# Patient Record
Sex: FEMALE | Race: WHITE | NOT HISPANIC OR LATINO
[De-identification: names, ages, dates, MRNs, and addresses within clinical notes are randomized per-mention and may not be internally consistent; named-entity substitution may affect disease eponyms.]

---

## 2019-08-15 ENCOUNTER — APPOINTMENT (OUTPATIENT)
Dept: ORTHOPEDIC SURGERY | Facility: CLINIC | Age: 24
End: 2019-08-15
Payer: COMMERCIAL

## 2019-08-15 VITALS
HEART RATE: 64 BPM | BODY MASS INDEX: 26.12 KG/M2 | WEIGHT: 153 LBS | DIASTOLIC BLOOD PRESSURE: 89 MMHG | SYSTOLIC BLOOD PRESSURE: 134 MMHG | HEIGHT: 64 IN

## 2019-08-15 DIAGNOSIS — M54.2 CERVICALGIA: ICD-10-CM

## 2019-08-15 DIAGNOSIS — M54.5 LOW BACK PAIN: ICD-10-CM

## 2019-08-15 DIAGNOSIS — G89.29 LOW BACK PAIN: ICD-10-CM

## 2019-08-15 PROCEDURE — 99203 OFFICE O/P NEW LOW 30 MIN: CPT

## 2019-10-01 ENCOUNTER — APPOINTMENT (OUTPATIENT)
Dept: ORTHOPEDIC SURGERY | Facility: CLINIC | Age: 24
End: 2019-10-01
Payer: COMMERCIAL

## 2019-10-01 VITALS
BODY MASS INDEX: 25.49 KG/M2 | HEART RATE: 84 BPM | HEIGHT: 65 IN | SYSTOLIC BLOOD PRESSURE: 149 MMHG | WEIGHT: 153 LBS | DIASTOLIC BLOOD PRESSURE: 95 MMHG

## 2019-10-01 DIAGNOSIS — M23.92 UNSPECIFIED INTERNAL DERANGEMENT OF LEFT KNEE: ICD-10-CM

## 2019-10-01 PROCEDURE — 99213 OFFICE O/P EST LOW 20 MIN: CPT

## 2019-10-01 PROCEDURE — 73562 X-RAY EXAM OF KNEE 3: CPT | Mod: LT

## 2019-10-01 NOTE — DISCUSSION/SUMMARY
[de-identified] : Discussion had with patient - clinically patient with snapping popliteal tendon, will try conservative treatment and have follow up with Sports\par Patient will follow up with [facility] \par Patient aware must follow up with MD for further eval and treatment \par Patient will start Physical Therapy \par Patients questions were answered and patient is satisfied with today's visit\par

## 2019-10-01 NOTE — PHYSICAL EXAM
[UE/LE] : Sensory: Intact in bilateral upper & lower extremities [ALL] : Biceps, brachioradialis, triceps, patellar, ankle and plantar 2+ and symmetric bilaterally [Poor Appearance] : well-appearing [Acute Distress] : not in acute distress [Normal] : Oriented to person, place, and time, insight and judgement were intact and the affect was normal [de-identified] : 3 view left knee reveals no acute findings  [de-identified] : \par FROM to hip\par \par Skin Warm, Dry, no erythema, no lesions \par \par Knee \par stable\par anatomic alignment\par ROM 0-130\par Valgus/Varus Stress intact \par Effusion - Negative\par Crepitus - Negative \par Patella Grind - Negative \par Patella Apprehension - Negative \par Medial joint line tenderness - Negative\par Lateral Joint line tenderness - Negative\par Tiffany Test - Negative  \par Lachman test - Negative \par Anterior Drawer Test -  Negative \par \par Distal Motor Function intact \par Sensation intact to light touch bilaterally \par Pulses 2+ bilaterally\par

## 2019-10-01 NOTE — HISTORY OF PRESENT ILLNESS
[de-identified] : Patient is a 24 year old female who presents c/o chronic sensation of snapping to posterior left knee. 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 patient states when she flexes knee and than extends she feels a popping sensation.  no buckling, locking of knee. patient has not had any treatment for knee [Pain Location] : pain

## 2019-10-04 PROBLEM — M23.92 INTERNAL DERANGEMENT OF LEFT KNEE: Status: ACTIVE | Noted: 2019-10-01

## 2019-10-25 ENCOUNTER — MOBILE ON CALL (OUTPATIENT)
Age: 24
End: 2019-10-25

## 2021-07-10 ENCOUNTER — APPOINTMENT (OUTPATIENT)
Dept: ORTHOPEDIC SURGERY | Facility: CLINIC | Age: 26
End: 2021-07-10
Payer: COMMERCIAL

## 2021-07-10 VITALS
WEIGHT: 160 LBS | HEIGHT: 65 IN | DIASTOLIC BLOOD PRESSURE: 90 MMHG | SYSTOLIC BLOOD PRESSURE: 150 MMHG | BODY MASS INDEX: 26.66 KG/M2 | HEART RATE: 61 BPM

## 2021-07-10 DIAGNOSIS — M79.A29 NONTRAUMATIC COMPARTMENT SYNDROME OF UNSPECIFIED LOWER EXTREMITY: ICD-10-CM

## 2021-07-10 PROCEDURE — 99072 ADDL SUPL MATRL&STAF TM PHE: CPT

## 2021-07-10 PROCEDURE — 99214 OFFICE O/P EST MOD 30 MIN: CPT

## 2021-07-10 NOTE — PHYSICAL EXAM
[de-identified] : Physical exam of bilateral lower extremities (shins):\par \par No erythema, warmth, rubor present.  The patient does have some mild tenderness bilateral tibias, mid tibial shaft.  She also has some mild tenderness over the anterior tibialis muscle bellies.  The patient has negative Homans signs bilaterally.  She has normal knee range of motion with normal knee exams.  She has normal ankle range of motion with normal ankle exams.  Neurovascularly intact.  Capillary refill in the toes is normal.  Dorsalis pedis pulse on both feet is normal. [de-identified] : No imaging performed today.

## 2021-07-10 NOTE — DISCUSSION/SUMMARY
[de-identified] : At this time I explained to the patient that I do believe she has some mild shinsplints but I am more concerned for exertional compartment syndrome in bilateral lower extremities.  I gave her a referral to meet with our sports medicine physician as soon as possible to be evaluated for exertional compartment syndrome in both of her legs.  In the interim the patient will refrain from anything that causes increased pain in her lower legs, exercise wise.  All of her questions were answered and she understood the treatment course at this time.

## 2021-07-10 NOTE — HISTORY OF PRESENT ILLNESS
[de-identified] : The patient is a 25-year-old female who presents with chronic bilateral shin pain.  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  She feels as though her legs want to explode and her feet want to explode while she is running.  They feel extra heavy when this occurs with severe pain.  Her pain scale at rest is 0 out of 10.  Her pain scale at worst with exercising is an 8 out of 10.  It is preventing her from running long distance.  She is here to have her shins evaluated.  No numbness or tingling going down her legs.  No other complaints.

## 2021-07-10 NOTE — REASON FOR VISIT
[Initial Visit] : an initial visit for [Other: ____] : [unfilled] [FreeTextEntry2] : Chronic bilateral shin pain

## 2021-08-02 ENCOUNTER — APPOINTMENT (OUTPATIENT)
Dept: ORTHOPEDIC SURGERY | Facility: CLINIC | Age: 26
End: 2021-08-02
Payer: COMMERCIAL

## 2021-08-02 VITALS
BODY MASS INDEX: 26.66 KG/M2 | DIASTOLIC BLOOD PRESSURE: 94 MMHG | HEART RATE: 70 BPM | HEIGHT: 65 IN | WEIGHT: 160 LBS | SYSTOLIC BLOOD PRESSURE: 141 MMHG

## 2021-08-02 DIAGNOSIS — Z78.9 OTHER SPECIFIED HEALTH STATUS: ICD-10-CM

## 2021-08-02 PROCEDURE — 73590 X-RAY EXAM OF LOWER LEG: CPT | Mod: 50

## 2021-08-02 PROCEDURE — 99213 OFFICE O/P EST LOW 20 MIN: CPT

## 2021-08-30 ENCOUNTER — APPOINTMENT (OUTPATIENT)
Dept: ORTHOPEDIC SURGERY | Facility: CLINIC | Age: 26
End: 2021-08-30
Payer: COMMERCIAL

## 2021-08-30 VITALS
WEIGHT: 160 LBS | HEIGHT: 65 IN | HEART RATE: 67 BPM | BODY MASS INDEX: 26.66 KG/M2 | SYSTOLIC BLOOD PRESSURE: 132 MMHG | DIASTOLIC BLOOD PRESSURE: 86 MMHG

## 2021-08-30 DIAGNOSIS — S86.899A OTHER INJURY OF OTHER MUSCLE(S) AND TENDON(S) AT LOWER LEG LEVEL, UNSPECIFIED LEG, INITIAL ENCOUNTER: ICD-10-CM

## 2021-08-30 PROCEDURE — 99213 OFFICE O/P EST LOW 20 MIN: CPT

## 2021-08-30 NOTE — PHYSICAL EXAM
[de-identified] : General:\par Awake, alert, no acute distress, Patient was cooperative and appropriate during the examination.\par \par The patient's weight is of normal weight for height and age.\par \par Ambulates without an antalgic gait.\par \par Full, painless range of motion of the neck and back.\par \par Exam of the bilateral lower extremities is intact and symmetric with regards to dermatologic, vascular, and neurologic exam. Bilateral lower extremity sensation is grossly intact to light touch in the DP/SP/T/S/S nerve distributions. Intact DF/PF/EHL. BIlateral lower extremity warm and well-perfused with brisk capillary refill.\par \par Pulmonary:\par Regular, nonlabored breathing\par \par Abdomen:\par Soft, nontender, nondistended.\par \par Lymphatic:\par No evidence of inguinal lymphadenopathy\par \par \par \par Bilateral lower extremity examination:\par Physical examination of the bilateral lower extremities demonstrates normal skin without signs of skin changes or abnormalities. No erythema, warmth, or joint effusion is appreciated.\par \par Sensation is intact to light touch L2-S1 bilaterally\par Palpable DP/PT pulses\par EHL/FHL/TA/GSC motor function intact bilaterally\par \par Knee range of motion:\par 0 to 130 degrees \par \par Ankle Range of Motion:\par Dorsiflexion 20\par Plantarflexion 40\par Inversion 30\par Eversion 20\par \par Strength Testing\par Quadriceps/hamstrings 5/5\par Dorsiflexion 5/5\par Plantarflexion 5/5\par Inversion 5/5\par Eversion 5/5\par \par Palpation\par Not tender to palpation about the knees\par Not tender to palpation over the lateral malleoli\par Not tender to palpation over the medial malleoli\par Mild tenderness to palpation over the anteromedial borders of the tibias, worse on the right\par Not tender to palpation over the ATFLs, CFLs, PTFLs\par Not tender to palpation over the calcaneal tuberosities\par Not tender to palpation over the peroneal tendons\par Not tender to palpation over the tarsals, metatarsals, or phalanges\par No palpable defect within the achilles tendons\par \par Special Tests:\par Anterior/posterior drawer test of the knee are negative\par Lachman exam is negative\par No varus or valgus laxity of the knee at 0 or 30 degrees of knee flexion\par Ankle anterior drawer negative bilaterally\par Squeeze test negative bilaterally\par Krishnamurthy's test negative bilaterally [de-identified] : MRI of the right lower leg from  radiology was reviewed the patient today in the office.  There is no evidence of any stress fracture.  No muscular injury.  Nonspecific edema in the anterior soft tissues of the lower leg.\par \par X-rays including AP and lateral view of both tibias from my office on 8/2/2021 reviewed today again with the patient.  No acute fracture or dislocation. Patient does have cortical thickening of the medial distal tibial cortex which is more obvious in the right than the left without any obvious stress fracture.

## 2021-08-30 NOTE — DISCUSSION/SUMMARY
[de-identified] : Assessment: 26-year-old female with bilateral leg pain secondary to shinsplints\par \par Plan: I had a long discussion with the patient today regarding the nature of their diagnosis and treatment plan. We discussed the risks and benefits of no treatment as well as nonoperative and operative treatments.  I reviewed the patient's MRI today with her in the office which does not demonstrate any stress fracture of her right lower extremity.  Clinically, the patient has improved with symptomatic treatments which have included activity and routine modifications to her exercise program.  She is happy with her progress so far.  So long as she is pain-free she may continue to exercise regularly and may attempt to do more vigorous exercise as tolerated.  She is advised to start slowly and progress gradually.  At this point the patient may follow-up with me on an as-needed basis.  If she does continue to complain of any severe symptoms in the future we may refer her for consideration for testing of exertional compartment syndrome.\par \par The patient verbalizes their understanding and agrees with the plan.  All questions were answered to their satisfaction.

## 2021-08-30 NOTE — HISTORY OF PRESENT ILLNESS
[de-identified] : 8/30/21: Mellisa is a pleasant 26-year-old female returns to the office today for follow-up regarding her bilateral shin pain.  The patient states that her symptoms are improved since her previous visit.  She has been modifying her exercise routine and has avoided running.  She has been doing cycling instead and has been feeling better.  She recently had an MRI of the right lower extremity to exclude a medial tibial stress fracture and she comes in to discuss those results to me today and any further recommendations.  The patient denies any fevers, chills, sweats, recent illnesses, numbness, tingling, weakness, or pain elsewhere at this time.\par \par 8/2/21: Mellisa is a pleasant 26 year old female who presents with bilateral shin pain.  The patient states that her symptoms began approximately 1-1 and half years ago but she denies any history of trauma.  She is very active and participates in classes at [employer] which usually involve high intensity interval training.  The pain specifically begins after running for short period of time.  She gets pain in both legs but it is usually worse on the right.  When she stops running the pain eventually resolves over approximately 20 minutes or so.  If she rides a stationary bike or does a less repetitive impact on her legs she does not get the same type of symptoms.  She does not have any pain at rest.  She has seen FARRAH Gracia who evaluated her several weeks ago and was concern for a possible exertional compartment syndrome and she was referred to me for further evaluation.  She has not had any therapy or injections before.  She has not had any imaging before.  The patient denies any fevers, chills, sweats, recent illnesses, numbness, tingling, weakness, or pain elsewhere at this time.  She occasionally will get a numb sensation as well as a heaviness in her legs when she has the symptoms.

## 2021-10-22 ENCOUNTER — NON-APPOINTMENT (OUTPATIENT)
Age: 26
End: 2021-10-22

## 2021-10-22 ENCOUNTER — APPOINTMENT (OUTPATIENT)
Dept: FAMILY MEDICINE | Facility: CLINIC | Age: 26
End: 2021-10-22
Payer: COMMERCIAL

## 2021-10-22 VITALS
DIASTOLIC BLOOD PRESSURE: 78 MMHG | WEIGHT: 170 LBS | OXYGEN SATURATION: 98 % | SYSTOLIC BLOOD PRESSURE: 122 MMHG | BODY MASS INDEX: 28.32 KG/M2 | HEIGHT: 65 IN | HEART RATE: 110 BPM

## 2021-10-22 VITALS — SYSTOLIC BLOOD PRESSURE: 160 MMHG | DIASTOLIC BLOOD PRESSURE: 84 MMHG

## 2021-10-22 DIAGNOSIS — R03.0 ELEVATED BLOOD-PRESSURE READING, W/OUT DIAGNOSIS OF HYPERTENSION: ICD-10-CM

## 2021-10-22 DIAGNOSIS — Z23 ENCOUNTER FOR IMMUNIZATION: ICD-10-CM

## 2021-10-22 DIAGNOSIS — Z00.00 ENCOUNTER FOR GENERAL ADULT MEDICAL EXAMINATION W/OUT ABNORMAL FINDINGS: ICD-10-CM

## 2021-10-22 PROCEDURE — 99395 PREV VISIT EST AGE 18-39: CPT | Mod: 25

## 2021-10-22 PROCEDURE — G0008: CPT

## 2021-10-22 PROCEDURE — 90686 IIV4 VACC NO PRSV 0.5 ML IM: CPT

## 2021-10-22 PROCEDURE — 36415 COLL VENOUS BLD VENIPUNCTURE: CPT

## 2021-10-22 NOTE — ASSESSMENT
[FreeTextEntry1] : WILLIE LANDA is a 26 year  old female presenting to the clinic to establish care.\par \par # PE/Vitals \par - repeat blood pressure: 160 \par \par # PHQ-2 Behavioral Health Screenin\par # Reviewed Patient Medical History\par \par # Reviewed Patient Concerns\par - elevated blood pressure; has a family history of HTN and thyroid issues \par - back pain; possibly could be from enlarged breast \par \par

## 2021-10-22 NOTE — HEALTH RISK ASSESSMENT
[Good] : ~his/her~  mood as  good [No] : No [No falls in past year] : Patient reported no falls in the past year [PHQ-2 Negative - No further assessment needed] : PHQ-2 Negative - No further assessment needed [PHQ-9 Negative - No further assessment needed] : PHQ-9 Negative - No further assessment needed [Employed] : employed [] : No [[id number]] : 0

## 2021-10-22 NOTE — PLAN
[FreeTextEntry1] : # blood work today \par # administer Flu Vaccine today \par # advise to have a low sodium diet \par # Start Rx Amlodipine 5 MG for HTN, f/u if side effects occur \par # f/u in month or sooner if needed\par # need to see pain management

## 2021-10-22 NOTE — END OF VISIT
[FreeTextEntry3] : This note was written by Marilee Trivedi on 10/22/2021, acting as a scribe for Dr. Jh MD.\par \par All medical record entries were at my, Dr. Nan Peñaloza MD, direction and personally dictated by me in 10/22/2021. I have personally reviewed the chart and agree that the record accurately reflects my personal performance of the history, physical exam, assessment, and plan.\par

## 2021-10-22 NOTE — HISTORY OF PRESENT ILLNESS
[FreeTextEntry1] : NEW-CPE [de-identified] : WILLIE LANDA is a 26 year old female presenting to the clinic to establish care.  Mood is normal. Patient complains of back pain. States sometimes the pain is so severe that it radiates to her legs. Possibly could be from enlarged breasts. \par \par Patient states every time she goes to the doctor office it seems blood pressure is elevated. Has a concern if weight plays a factor in elevated blood pressure. Pt stopped taking birth control as advise by GYN. Patient has a family history of HTN and thyroid issues. . Notes she has a hard time losing weight. 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   \par \par Currently taking no medications. No Past Medical History. No Surgical History. Family Medical History includes: on maternal side HTN and thyroid issues No Known Allergies to food/medications. Denies any recent ER visits/ hospitalizations/ MVAs or MSK injuries. \par

## 2021-10-26 ENCOUNTER — TRANSCRIPTION ENCOUNTER (OUTPATIENT)
Age: 26
End: 2021-10-26

## 2021-11-06 LAB
ALBUMIN SERPL ELPH-MCNC: 5.2 G/DL
ALP BLD-CCNC: 63 U/L
ALT SERPL-CCNC: 15 U/L
ANION GAP SERPL CALC-SCNC: 14 MMOL/L
APPEARANCE: CLEAR
AST SERPL-CCNC: 16 U/L
BASOPHILS # BLD AUTO: 0.05 K/UL
BASOPHILS NFR BLD AUTO: 0.7 %
BILIRUB SERPL-MCNC: 0.4 MG/DL
BILIRUBIN URINE: NEGATIVE
BLOOD URINE: NEGATIVE
BUN SERPL-MCNC: 13 MG/DL
CALCIUM SERPL-MCNC: 9.9 MG/DL
CHLORIDE SERPL-SCNC: 102 MMOL/L
CO2 SERPL-SCNC: 25 MMOL/L
COLOR: YELLOW
CREAT SERPL-MCNC: 0.92 MG/DL
EOSINOPHIL # BLD AUTO: 0.07 K/UL
EOSINOPHIL NFR BLD AUTO: 0.9 %
ESTIMATED AVERAGE GLUCOSE: 100 MG/DL
FERRITIN SERPL-MCNC: 72 NG/ML
FOLATE SERPL-MCNC: 19.7 NG/ML
GLUCOSE QUALITATIVE U: NEGATIVE
GLUCOSE SERPL-MCNC: 107 MG/DL
HBA1C MFR BLD HPLC: 5.1 %
HCT VFR BLD CALC: 46.8 %
HGB BLD-MCNC: 15.6 G/DL
IMM GRANULOCYTES NFR BLD AUTO: 0.3 %
IRON SATN MFR SERPL: 18 %
IRON SERPL-MCNC: 57 UG/DL
KETONES URINE: NORMAL
LEUKOCYTE ESTERASE URINE: NEGATIVE
LYMPHOCYTES # BLD AUTO: 1.93 K/UL
LYMPHOCYTES NFR BLD AUTO: 25.6 %
MAN DIFF?: NORMAL
MCHC RBC-ENTMCNC: 30.9 PG
MCHC RBC-ENTMCNC: 33.3 GM/DL
MCV RBC AUTO: 92.7 FL
MONOCYTES # BLD AUTO: 0.44 K/UL
MONOCYTES NFR BLD AUTO: 5.8 %
NEUTROPHILS # BLD AUTO: 5.02 K/UL
NEUTROPHILS NFR BLD AUTO: 66.7 %
NITRITE URINE: NEGATIVE
PH URINE: 5.5
PLATELET # BLD AUTO: 357 K/UL
POTASSIUM SERPL-SCNC: 4.3 MMOL/L
PROT SERPL-MCNC: 7.8 G/DL
PROTEIN URINE: NORMAL
RBC # BLD: 5.05 M/UL
RBC # FLD: 11.8 %
SODIUM SERPL-SCNC: 141 MMOL/L
SPECIFIC GRAVITY URINE: 1.03
TIBC SERPL-MCNC: 327 UG/DL
TSH SERPL-ACNC: 2.92 UIU/ML
UIBC SERPL-MCNC: 270 UG/DL
UROBILINOGEN URINE: NORMAL
VIT B12 SERPL-MCNC: 519 PG/ML
WBC # FLD AUTO: 7.53 K/UL

## 2021-11-09 ENCOUNTER — TRANSCRIPTION ENCOUNTER (OUTPATIENT)
Age: 26
End: 2021-11-09

## 2021-11-09 LAB — 25(OH)D3 SERPL-MCNC: 27.1 NG/ML

## 2021-11-27 ENCOUNTER — APPOINTMENT (OUTPATIENT)
Dept: ULTRASOUND IMAGING | Facility: CLINIC | Age: 26
End: 2021-11-27
Payer: COMMERCIAL

## 2021-11-27 ENCOUNTER — OUTPATIENT (OUTPATIENT)
Dept: OUTPATIENT SERVICES | Facility: HOSPITAL | Age: 26
LOS: 1 days | End: 2021-11-27
Payer: COMMERCIAL

## 2021-11-27 DIAGNOSIS — R03.0 ELEVATED BLOOD-PRESSURE READING, WITHOUT DIAGNOSIS OF HYPERTENSION: ICD-10-CM

## 2021-11-30 ENCOUNTER — OUTPATIENT (OUTPATIENT)
Dept: OUTPATIENT SERVICES | Facility: HOSPITAL | Age: 26
LOS: 1 days | End: 2021-11-30

## 2021-11-30 ENCOUNTER — APPOINTMENT (OUTPATIENT)
Dept: ULTRASOUND IMAGING | Facility: CLINIC | Age: 26
End: 2021-11-30

## 2021-11-30 DIAGNOSIS — R03.0 ELEVATED BLOOD-PRESSURE READING, WITHOUT DIAGNOSIS OF HYPERTENSION: ICD-10-CM

## 2021-11-30 PROCEDURE — 93975 VASCULAR STUDY: CPT | Mod: 26

## 2021-11-30 PROCEDURE — 93975 VASCULAR STUDY: CPT

## 2021-12-03 ENCOUNTER — APPOINTMENT (OUTPATIENT)
Dept: FAMILY MEDICINE | Facility: CLINIC | Age: 26
End: 2021-12-03
Payer: COMMERCIAL

## 2021-12-03 VITALS
WEIGHT: 165 LBS | BODY MASS INDEX: 27.49 KG/M2 | SYSTOLIC BLOOD PRESSURE: 136 MMHG | OXYGEN SATURATION: 98 % | HEIGHT: 65 IN | DIASTOLIC BLOOD PRESSURE: 88 MMHG | HEART RATE: 106 BPM

## 2021-12-03 PROCEDURE — 99213 OFFICE O/P EST LOW 20 MIN: CPT

## 2021-12-03 NOTE — END OF VISIT
[FreeTextEntry3] : This note was written by Marilee Trivedi on 12/03/2021, acting as a scribe for Dr. Jh MD.\par \par All medical record entries were at my, Dr. Nan Peñaloza MD, direction and personally dictated by me in 12/03/2021. I have personally reviewed the chart and agree that the record accurately reflects my personal performance of the history, physical exam, assessment, and plan.\par

## 2021-12-03 NOTE — HISTORY OF PRESENT ILLNESS
[FreeTextEntry1] :  Follow up for HTN  [de-identified] : WILLIE LANDA is a 26 year old female patient presenting to the clinic today for blood pressure HTN follow up. Patient states she had a presentation for her job before coming to office could possibly be the reason of elevated heart rate. \par

## 2021-12-03 NOTE — ASSESSMENT
[FreeTextEntry1] : WILLIE LANDA is a 26 year old female patient presenting to the clinic today for blood pressure follow up.\par \par # PE/Vitals \par - slightly elevated blood pressure \par \par # HTN \par - uncontrolled; medication adjustment may be needed \par \par # Reviewed Last Lab results \par - Vitamin D levels low \par - kidney levels normal \par \par \par

## 2021-12-03 NOTE — PLAN
[FreeTextEntry1] : # Renew Rx Amlodipine Besylate 5 MG for HTN \par # continue to take the medication Amlodipine besylate 5 MG for HTN \par # advise the goal of blood pressure is to keep it below 135/85\par # pt will wait to take a second medication for HTN, will continue to monitor at home and diet/ exercise \par # advise to have a low sodium diet \par # advise pt if blood pressure is not controlled in the next two months medication adjustment is needed \par # advise to take Vitamin D 1000 Units \par # f/u in two months or sooner if needed

## 2022-04-06 ENCOUNTER — RX RENEWAL (OUTPATIENT)
Age: 27
End: 2022-04-06

## 2022-05-13 ENCOUNTER — APPOINTMENT (OUTPATIENT)
Dept: FAMILY MEDICINE | Facility: CLINIC | Age: 27
End: 2022-05-13

## 2022-05-20 ENCOUNTER — APPOINTMENT (OUTPATIENT)
Dept: FAMILY MEDICINE | Facility: CLINIC | Age: 27
End: 2022-05-20
Payer: COMMERCIAL

## 2022-05-20 ENCOUNTER — RX RENEWAL (OUTPATIENT)
Age: 27
End: 2022-05-20

## 2022-05-20 VITALS
HEART RATE: 94 BPM | WEIGHT: 174 LBS | OXYGEN SATURATION: 99 % | SYSTOLIC BLOOD PRESSURE: 142 MMHG | BODY MASS INDEX: 28.99 KG/M2 | HEIGHT: 65 IN | DIASTOLIC BLOOD PRESSURE: 90 MMHG

## 2022-05-20 DIAGNOSIS — I10 ESSENTIAL (PRIMARY) HYPERTENSION: ICD-10-CM

## 2022-05-20 DIAGNOSIS — R63.5 ABNORMAL WEIGHT GAIN: ICD-10-CM

## 2022-05-20 PROCEDURE — 99214 OFFICE O/P EST MOD 30 MIN: CPT

## 2022-05-20 RX ORDER — AMLODIPINE BESYLATE 5 MG/1
5 TABLET ORAL DAILY
Qty: 30 | Refills: 0 | Status: DISCONTINUED | COMMUNITY
Start: 2021-10-22 | End: 2022-05-20

## 2022-05-20 NOTE — PLAN
[FreeTextEntry1] : Patient declined meds\par cardiology referral\par discussed relaxation technique\par  follow up 6 month\par \par # STOP Rx Amlodipine Besylate 5 MG for HTN - patient wants to stop understand risk\par # continue to take the medication Amlodipine besylate 5 MG for HTN \par # advise the goal of blood pressure is to keep it below 135/85\par # pt will wait to take a second medication for HTN, will continue to monitor at home and diet/ exercise \par # advise to have a low sodium diet \par # advise pt if blood pressure is not controlled in the next two months medication adjustment is needed \par # advise to take Vitamin D 1000 Units \par # f/u in two months or sooner if needed

## 2022-05-20 NOTE — HISTORY OF PRESENT ILLNESS
[FreeTextEntry1] :  Follow up for HTN  [de-identified] : WILLIE LANDA is a 26 year old female patient presenting to the clinic today for blood pressure HTN follow up. Patient states she had a presentation for her job before coming to office could possibly be the reason of elevated heart rate. \par 05/2022- doesnot want to be on meds anymore\par training hard and watching diet but her weight fluctuated by 10 lbs\par

## 2022-11-23 ENCOUNTER — APPOINTMENT (OUTPATIENT)
Dept: FAMILY MEDICINE | Facility: CLINIC | Age: 27
End: 2022-11-23

## 2024-02-07 ENCOUNTER — NON-APPOINTMENT (OUTPATIENT)
Age: 29
End: 2024-02-07

## 2024-08-05 ENCOUNTER — APPOINTMENT (OUTPATIENT)
Dept: INTERNAL MEDICINE | Facility: CLINIC | Age: 29
End: 2024-08-05

## 2024-08-05 ENCOUNTER — LABORATORY RESULT (OUTPATIENT)
Age: 29
End: 2024-08-05

## 2024-08-05 PROBLEM — Z80.0 FAMILY HISTORY OF PANCREATIC CANCER: Status: ACTIVE | Noted: 2024-08-05

## 2024-08-05 PROBLEM — Z83.49 FAMILY HISTORY OF HYPERTHYROIDISM: Status: ACTIVE | Noted: 2024-08-05

## 2024-08-05 PROBLEM — Z82.49 FAMILY HISTORY OF MYOCARDIAL INFARCTION: Status: ACTIVE | Noted: 2024-08-05

## 2024-08-05 PROBLEM — Z83.49 FAMILY HISTORY OF HYPOTHYROIDISM: Status: ACTIVE | Noted: 2024-08-05

## 2024-08-05 PROBLEM — Z82.49 FAMILY HISTORY OF HYPERTENSION: Status: ACTIVE | Noted: 2024-08-05

## 2024-08-05 PROCEDURE — 99385 PREV VISIT NEW AGE 18-39: CPT

## 2024-08-05 PROCEDURE — G2211 COMPLEX E/M VISIT ADD ON: CPT | Mod: NC

## 2024-08-05 PROCEDURE — 81003 URINALYSIS AUTO W/O SCOPE: CPT | Mod: QW

## 2024-08-05 PROCEDURE — 36415 COLL VENOUS BLD VENIPUNCTURE: CPT

## 2024-08-05 NOTE — PLAN
[FreeTextEntry1] : HTN: - Secondary HTN ruled out by cardiology and previous PCP as per patient. Renal US from 2021 negative for renal artery stenosis - Patient did not tolerate amlodipine (LE edema) - Rx losartan 25 mg PO daily sent to patient's pharmacy' - Recommend patient check BPs at home at least 1 hour after taking losartan. - Patient to call office if BPs sustaining > 130/90 after taking losartan - Recommend reducing dietary sodium intake (although already minimal as per patient)  Fatigue: - Will check labs today for hypothyroidism, anemia, etc - Likely due to insomnia - If labs normal, recommend reducing alcohol intake as it can be disruptive to sleep, and moving up 3pm coffee to before noon to avoid insomnia 2/2 caffeine use - Patient denies snoring, likely not ROGER causing fatigue - Can consider addition of trazodone at night if sleep/insomnia do not improve with lifestyle changes  Obesity: - Recommend decreasing alcohol consumption, as it increases caloric intake without providing nutritional benefit lower alcohol consumption - Continue exercise 5 days/week with combination of strength training and cardio - Possible that BMI not completely accurate given frequent strength training and enlarged breast size contributing to weight. - Recommend patient to limit dietary sources of cholesterol/saturated fats including red meats, processed meats, cheese, butter, and fried foods. Diet should consist of lean meats such as grilled chicken and fish, vegetables, and only a small amount of complex carbohydrates.  Pre-conception planning: - Discussed that losartan cannot be used in pregnancy. Will switch patient to antihypertensive safe in pregnancy when she is ready to get pregnant - Recommend initiation of prenatal vitamin several months before she desires to get pregnant - Recommend consistent condom use to avoid pregnancy and STIs  HCM: - Labs/urine collected and sent at today's visit. - Next pap- Has appt for October - Next eye exam- 2025 - Next skin cancer screening- Fall/winter 2024 - Next dental exam- has appt in November  f/u 1 month for BP check

## 2024-08-05 NOTE — HISTORY OF PRESENT ILLNESS
[FreeTextEntry1] : CPE [de-identified] : Patient is a 29-year-old female presenting for a CPE. Patient reports a hx HTN. Saw cardiology 2 years ago who advised weight loss. She was put on amlodipine 5 mg and had LE edema, so it was stopped. She avoids salt intake. Has restaurant food 2x/week. Patient reports she works out very often doing [employer]. Patient reports always feeling tired. She doesn't see her friends a lot because she's tired. Feels like she's lethargic. She does not snore at night. She is up until 2am at night. She drinks 2-3 cups of caffeine per day. Last cup of caffeine for the day is at 3pm. Patient reports she is in a relationship with a male partner, currently using withdrawal method. Desires to get pregnant in 3-4 years  Last pap- > 3 years ago (Last GYN Dr. Albaro Arauz). Has appt in 2 months Last eye exam- 2024 (change in contact prescription) Last dental exam- < 6 months ago, has upcoming appt Last skin cancer screening- Fall/winter 2023 (normal)

## 2024-08-05 NOTE — HEALTH RISK ASSESSMENT
[Fair] :  ~his/her~ mood as fair [With Family] : lives with family [Employed] : employed [Yes] : Yes [2 - 3 times a week (3 pts)] : 2 - 3  times a week (3 points) [3 or 4 (1 pt)] : 3 or 4  (1 point) [Monthly (2 pts)] : Monthly (2 points) [No] : In the past 12 months have you used drugs other than those required for medical reasons? No [No falls in past year] : Patient reported no falls in the past year [No Retinopathy] : No retinopathy [Patient reported PAP Smear was normal] : Patient reported PAP Smear was normal [Significant Other] : lives with significant other [Sexually Active] : sexually active [Feels Safe at Home] : Feels safe at home [Fully functional (bathing, dressing, toileting, transferring, walking, feeding)] : Fully functional (bathing, dressing, toileting, transferring, walking, feeding) [Fully functional (using the telephone, shopping, preparing meals, housekeeping, doing laundry, using] : Fully functional and needs no help or supervision to perform IADLs (using the telephone, shopping, preparing meals, housekeeping, doing laundry, using transportation, managing medications and managing finances) [Reports normal functional visual acuity (ie: able to read med bottle)] : Reports normal functional visual acuity [Smoke Detector] : smoke detector [Carbon Monoxide Detector] : carbon monoxide detector [Safety elements used in home] : safety elements used in home [Seat Belt] :  uses seat belt [Sunscreen] : uses sunscreen [Never] : Never [1] : 1) Little interest or pleasure doing things for several days (1) [0] : 2) Feeling down, depressed, or hopeless: Not at all (0) [PHQ-2 Negative - No further assessment needed] : PHQ-2 Negative - No further assessment needed [HIV test declined] : HIV test declined [Hepatitis C test declined] : Hepatitis C test declined [NO] : No [Audit-CScore] : 6 [de-identified] : [employer] 3x/week, [employer] 1-2 days per week [de-identified] : doesn't eat enough, eats fairly healthy, drinks a lot of water. Grilled chicken, salads, veggies [[id number]] : 1 [EyeExamDate] : 2024 [High Risk Behavior] : no high risk behavior [Reports changes in hearing] : Reports no changes in hearing [Reports changes in vision] : Reports no changes in vision [Reports changes in dental health] : Reports no changes in dental health [Travel to Developing Areas] : does not  travel to developing areas [TB Exposure] : is not being exposed to tuberculosis [Caregiver Concerns] : does not have caregiver concerns [MammogramDate] : N/A [PapSmearDate] : > 3 years ago [PapSmearComments] : normal [BoneDensityDate] : N/A [ColonoscopyDate] : N/A [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

## 2024-08-07 PROBLEM — E03.8 SUBCLINICAL HYPOTHYROIDISM: Status: ACTIVE | Noted: 2024-08-07

## 2024-10-03 ENCOUNTER — RX RENEWAL (OUTPATIENT)
Age: 29
End: 2024-10-03

## 2024-10-18 ENCOUNTER — APPOINTMENT (OUTPATIENT)
Dept: INTERNAL MEDICINE | Facility: CLINIC | Age: 29
End: 2024-10-18
Payer: COMMERCIAL

## 2024-10-18 ENCOUNTER — LABORATORY RESULT (OUTPATIENT)
Age: 29
End: 2024-10-18

## 2024-10-18 VITALS
HEART RATE: 95 BPM | DIASTOLIC BLOOD PRESSURE: 92 MMHG | BODY MASS INDEX: 31.49 KG/M2 | HEIGHT: 65 IN | WEIGHT: 189 LBS | SYSTOLIC BLOOD PRESSURE: 141 MMHG | OXYGEN SATURATION: 99 %

## 2024-10-18 DIAGNOSIS — E03.8 OTHER SPECIFIED HYPOTHYROIDISM: ICD-10-CM

## 2024-10-18 DIAGNOSIS — I10 ESSENTIAL (PRIMARY) HYPERTENSION: ICD-10-CM

## 2024-10-18 PROCEDURE — 36415 COLL VENOUS BLD VENIPUNCTURE: CPT

## 2024-10-18 PROCEDURE — 99214 OFFICE O/P EST MOD 30 MIN: CPT

## 2024-10-18 PROCEDURE — G2211 COMPLEX E/M VISIT ADD ON: CPT | Mod: NC

## 2024-10-21 LAB — TSH SERPL-ACNC: 4.84 UIU/ML

## 2024-10-25 ENCOUNTER — APPOINTMENT (OUTPATIENT)
Dept: OBGYN | Facility: CLINIC | Age: 29
End: 2024-10-25
Payer: COMMERCIAL

## 2024-10-25 VITALS
SYSTOLIC BLOOD PRESSURE: 144 MMHG | DIASTOLIC BLOOD PRESSURE: 86 MMHG | WEIGHT: 189 LBS | BODY MASS INDEX: 31.49 KG/M2 | HEIGHT: 65 IN

## 2024-10-25 DIAGNOSIS — Z01.419 ENCOUNTER FOR GYNECOLOGICAL EXAMINATION (GENERAL) (ROUTINE) W/OUT ABNORMAL FINDINGS: ICD-10-CM

## 2024-10-25 PROCEDURE — 99385 PREV VISIT NEW AGE 18-39: CPT

## 2024-10-29 LAB — HPV HIGH+LOW RISK DNA PNL CVX: NOT DETECTED

## 2024-11-02 LAB — CYTOLOGY CVX/VAG DOC THIN PREP: ABNORMAL

## 2024-12-06 ENCOUNTER — RX RENEWAL (OUTPATIENT)
Age: 29
End: 2024-12-06

## 2025-01-03 ENCOUNTER — NON-APPOINTMENT (OUTPATIENT)
Age: 30
End: 2025-01-03

## 2025-01-15 ENCOUNTER — RX RENEWAL (OUTPATIENT)
Age: 30
End: 2025-01-15

## 2025-01-21 ENCOUNTER — APPOINTMENT (OUTPATIENT)
Dept: OBGYN | Facility: CLINIC | Age: 30
End: 2025-01-21

## 2025-01-21 VITALS
BODY MASS INDEX: 31.65 KG/M2 | SYSTOLIC BLOOD PRESSURE: 147 MMHG | WEIGHT: 190 LBS | DIASTOLIC BLOOD PRESSURE: 99 MMHG | HEIGHT: 65 IN

## 2025-01-21 DIAGNOSIS — R87.619 UNSPECIFIED ABNORMAL CYTOLOGICAL FINDINGS IN SPECIMENS FROM CERVIX UTERI: ICD-10-CM

## 2025-01-21 DIAGNOSIS — N39.0 URINARY TRACT INFECTION, SITE NOT SPECIFIED: ICD-10-CM

## 2025-01-21 PROCEDURE — 99459 PELVIC EXAMINATION: CPT

## 2025-01-21 PROCEDURE — 99213 OFFICE O/P EST LOW 20 MIN: CPT | Mod: 25

## 2025-01-21 PROCEDURE — 57454 BX/CURETT OF CERVIX W/SCOPE: CPT

## 2025-01-21 RX ORDER — NITROFURANTOIN (MONOHYDRATE/MACROCRYSTALS) 25; 75 MG/1; MG/1
100 CAPSULE ORAL
Qty: 14 | Refills: 1 | Status: ACTIVE | COMMUNITY
Start: 2025-01-21 | End: 1900-01-01

## 2025-01-21 RX ORDER — PHENAZOPYRIDINE 200 MG/1
200 TABLET, FILM COATED ORAL 3 TIMES DAILY
Qty: 9 | Refills: 1 | Status: ACTIVE | COMMUNITY
Start: 2025-01-21 | End: 1900-01-01

## 2025-01-24 LAB — BACTERIA UR CULT: ABNORMAL

## 2025-01-27 LAB — CORE LAB BIOPSY: NORMAL

## 2025-02-06 ENCOUNTER — APPOINTMENT (OUTPATIENT)
Dept: OBGYN | Facility: CLINIC | Age: 30
End: 2025-02-06
Payer: COMMERCIAL

## 2025-02-06 VITALS — SYSTOLIC BLOOD PRESSURE: 131 MMHG | HEART RATE: 78 BPM | DIASTOLIC BLOOD PRESSURE: 88 MMHG

## 2025-02-06 DIAGNOSIS — N87.0 MILD CERVICAL DYSPLASIA: ICD-10-CM

## 2025-02-06 PROCEDURE — 99213 OFFICE O/P EST LOW 20 MIN: CPT

## 2025-03-14 ENCOUNTER — RX RENEWAL (OUTPATIENT)
Age: 30
End: 2025-03-14

## 2025-05-16 ENCOUNTER — RX RENEWAL (OUTPATIENT)
Age: 30
End: 2025-05-16

## 2025-07-11 ENCOUNTER — APPOINTMENT (OUTPATIENT)
Dept: INTERNAL MEDICINE | Facility: CLINIC | Age: 30
End: 2025-07-11
Payer: COMMERCIAL

## 2025-07-11 VITALS
HEART RATE: 83 BPM | WEIGHT: 190 LBS | HEIGHT: 65 IN | OXYGEN SATURATION: 96 % | SYSTOLIC BLOOD PRESSURE: 130 MMHG | BODY MASS INDEX: 31.65 KG/M2 | DIASTOLIC BLOOD PRESSURE: 80 MMHG

## 2025-07-11 PROBLEM — E66.9 OBESITY (BMI 30-39.9): Status: ACTIVE | Noted: 2025-07-11

## 2025-07-11 PROCEDURE — G0447 BEHAVIOR COUNSEL OBESITY 15M: CPT | Mod: 59

## 2025-07-11 PROCEDURE — 99214 OFFICE O/P EST MOD 30 MIN: CPT

## 2025-07-11 PROCEDURE — G2211 COMPLEX E/M VISIT ADD ON: CPT | Mod: NC

## 2025-08-13 ENCOUNTER — RX RENEWAL (OUTPATIENT)
Age: 30
End: 2025-08-13

## 2025-09-19 ENCOUNTER — LABORATORY RESULT (OUTPATIENT)
Age: 30
End: 2025-09-19

## 2025-09-19 ENCOUNTER — APPOINTMENT (OUTPATIENT)
Dept: INTERNAL MEDICINE | Facility: CLINIC | Age: 30
End: 2025-09-19
Payer: COMMERCIAL

## 2025-09-19 VITALS
BODY MASS INDEX: 31.65 KG/M2 | SYSTOLIC BLOOD PRESSURE: 134 MMHG | HEART RATE: 89 BPM | WEIGHT: 190 LBS | DIASTOLIC BLOOD PRESSURE: 90 MMHG | HEIGHT: 65 IN | OXYGEN SATURATION: 99 %

## 2025-09-19 DIAGNOSIS — E66.9 OBESITY, UNSPECIFIED: ICD-10-CM

## 2025-09-19 DIAGNOSIS — Z83.438 FAMILY HISTORY OF OTHER DISORDER OF LIPOPROTEIN METABOLISM AND OTHER LIPIDEMIA: ICD-10-CM

## 2025-09-19 DIAGNOSIS — Z11.3 ENCOUNTER FOR SCREENING FOR INFECTIONS WITH A PREDOMINANTLY SEXUAL MODE OF TRANSMISSION: ICD-10-CM

## 2025-09-19 DIAGNOSIS — Z00.00 ENCOUNTER FOR GENERAL ADULT MEDICAL EXAMINATION W/OUT ABNORMAL FINDINGS: ICD-10-CM

## 2025-09-19 DIAGNOSIS — I10 ESSENTIAL (PRIMARY) HYPERTENSION: ICD-10-CM

## 2025-09-19 PROCEDURE — G0447 BEHAVIOR COUNSEL OBESITY 15M: CPT | Mod: 59

## 2025-09-19 PROCEDURE — 99395 PREV VISIT EST AGE 18-39: CPT

## 2025-09-19 PROCEDURE — 36415 COLL VENOUS BLD VENIPUNCTURE: CPT

## 2025-09-21 LAB
ALBUMIN SERPL ELPH-MCNC: 4.7 G/DL
ALP BLD-CCNC: 70 U/L
ALT SERPL-CCNC: 29 U/L
ANION GAP SERPL CALC-SCNC: 15 MMOL/L
AST SERPL-CCNC: 23 U/L
BASOPHILS # BLD AUTO: 0.06 K/UL
BASOPHILS NFR BLD AUTO: 0.8 %
BILIRUB SERPL-MCNC: 0.5 MG/DL
BUN SERPL-MCNC: 11 MG/DL
CALCIUM SERPL-MCNC: 9.3 MG/DL
CHLORIDE SERPL-SCNC: 100 MMOL/L
CHOLEST SERPL-MCNC: 211 MG/DL
CO2 SERPL-SCNC: 23 MMOL/L
CREAT SERPL-MCNC: 0.93 MG/DL
EGFRCR SERPLBLD CKD-EPI 2021: 86 ML/MIN/1.73M2
EOSINOPHIL # BLD AUTO: 0.16 K/UL
EOSINOPHIL NFR BLD AUTO: 2 %
ESTIMATED AVERAGE GLUCOSE: 105 MG/DL
FOLATE SERPL-MCNC: 11.8 NG/ML
GLUCOSE SERPL-MCNC: 95 MG/DL
HBA1C MFR BLD HPLC: 5.3 %
HBV SURFACE AG SER QL: NONREACTIVE
HCT VFR BLD CALC: 47.1 %
HCV AB SER QL: NONREACTIVE
HCV S/CO RATIO: 0.08 S/CO
HDLC SERPL-MCNC: 53 MG/DL
HGB BLD-MCNC: 15 G/DL
HIV1+2 AB SPEC QL IA.RAPID: NONREACTIVE
IMM GRANULOCYTES NFR BLD AUTO: 0.4 %
LDLC SERPL-MCNC: 127 MG/DL
LYMPHOCYTES # BLD AUTO: 2.08 K/UL
LYMPHOCYTES NFR BLD AUTO: 26 %
MAN DIFF?: NORMAL
MCHC RBC-ENTMCNC: 30.4 PG
MCHC RBC-ENTMCNC: 31.8 G/DL
MCV RBC AUTO: 95.5 FL
MONOCYTES # BLD AUTO: 0.38 K/UL
MONOCYTES NFR BLD AUTO: 4.8 %
NEUTROPHILS # BLD AUTO: 5.29 K/UL
NEUTROPHILS NFR BLD AUTO: 66 %
NONHDLC SERPL-MCNC: 158 MG/DL
PLATELET # BLD AUTO: 319 K/UL
POTASSIUM SERPL-SCNC: 5 MMOL/L
PROT SERPL-MCNC: 7.7 G/DL
RBC # BLD: 4.93 M/UL
RBC # FLD: 12.4 %
SODIUM SERPL-SCNC: 137 MMOL/L
T PALLIDUM AB SER QL IA: NEGATIVE
TRIGL SERPL-MCNC: 176 MG/DL
TSH SERPL-ACNC: 4.56 UIU/ML
VIT B12 SERPL-MCNC: 412 PG/ML
WBC # FLD AUTO: 8 K/UL

## 2025-09-22 LAB
C TRACH RRNA SPEC QL NAA+PROBE: NOT DETECTED
N GONORRHOEA RRNA SPEC QL NAA+PROBE: NOT DETECTED
SOURCE AMPLIFICATION: NORMAL

## 2025-09-26 RX ORDER — TIRZEPATIDE 2.5 MG/.5ML
2.5 INJECTION, SOLUTION SUBCUTANEOUS
Qty: 1 | Refills: 0 | Status: ACTIVE | OUTPATIENT
Start: 2025-09-19